# Patient Record
Sex: MALE | Race: WHITE | NOT HISPANIC OR LATINO | URBAN - METROPOLITAN AREA
[De-identification: names, ages, dates, MRNs, and addresses within clinical notes are randomized per-mention and may not be internally consistent; named-entity substitution may affect disease eponyms.]

---

## 2021-09-22 ENCOUNTER — OFFICE VISIT (OUTPATIENT)
Dept: SURGERY | Facility: CLINIC | Age: 21
End: 2021-09-22
Payer: COMMERCIAL

## 2021-09-22 VITALS — HEIGHT: 69 IN | BODY MASS INDEX: 20.73 KG/M2 | WEIGHT: 140 LBS

## 2021-09-22 DIAGNOSIS — L02.91 ABSCESS: Primary | ICD-10-CM

## 2021-09-22 PROCEDURE — 99202 OFFICE O/P NEW SF 15 MIN: CPT | Performed by: SURGERY

## 2021-09-22 PROCEDURE — 10060 I&D ABSCESS SIMPLE/SINGLE: CPT | Performed by: SURGERY

## 2021-09-22 NOTE — PROGRESS NOTES
Assessment/Plan:    Diagnoses and all orders for this visit:    Abscess     status post incision and drainage here in the office  Local wound care instructions given  Will see back in 3 weeks where we can definitively remove his underlying sebaceous cyst    Subjective:      Patient ID: Malathi Eddy is a 21 y o  male  Patient presents for evaluation of a cyst on his left upper arm  He has had the cyst for 5 years  The cyst became swollen, red and painful 2 months ago  Abscess  The current episode started more than 1 month ago  The problem occurs daily  The following portions of the patient's history were reviewed and updated as appropriate:     He  has no past medical history on file  He  has no past surgical history on file  His family history is not on file  He  reports that he has quit smoking  He has never used smokeless tobacco  No history on file for alcohol use and drug use  No current outpatient medications on file  No current facility-administered medications for this visit  He has No Known Allergies       Review of Systems      Objective:      Ht 5' 9" (1 753 m)   Wt 63 5 kg (140 lb)   BMI 20 67 kg/m²          Physical Exam  Skin:     General: Skin is warm and dry  Comments: Abscessed sebaceous cyst of the lateral aspect of left upper arm  Risks and benefits for incision and drainage were discussed with him and agreed to proceed         Incision and Drainage    Date/Time: 9/22/2021 3:14 PM  Performed by: Eloy Hunter DO  Authorized by: Eloy Hunter DO   Universal Protocol:  Consent: Verbal consent obtained  Risks and benefits: risks, benefits and alternatives were discussed  Consent given by: patient  Time out: Immediately prior to procedure a "time out" was called to verify the correct patient, procedure, equipment, support staff and site/side marked as required    Patient understanding: patient states understanding of the procedure being performed  Patient identity confirmed: verbally with patient      Patient location:  Clinic  Location:     Type:  Abscess    Location:  Upper extremity    Upper extremity location:  L arm  Pre-procedure details:     Procedure prep: Alcohol  Procedure details:     Incision types:  Single straight    Scalpel blade:  11    Approach:  Open    Incision depth:  Subcutaneous    Wound management:  Probed and deloculated    Drainage:  Bloody and purulent    Wound treatment:  Wound left open    Packing materials:  1/4 in gauze  Post-procedure details:     Patient tolerance of procedure:   Tolerated well, no immediate complications

## 2021-10-20 ENCOUNTER — TRANSCRIBE ORDERS (OUTPATIENT)
Dept: SURGERY | Facility: CLINIC | Age: 21
End: 2021-10-20

## 2021-10-20 ENCOUNTER — OFFICE VISIT (OUTPATIENT)
Dept: SURGERY | Facility: CLINIC | Age: 21
End: 2021-10-20
Payer: COMMERCIAL

## 2021-10-20 VITALS — WEIGHT: 140 LBS | HEIGHT: 69 IN | BODY MASS INDEX: 20.73 KG/M2

## 2021-10-20 DIAGNOSIS — L72.3 SEBACEOUS CYST: Primary | ICD-10-CM

## 2021-10-20 PROCEDURE — 88304 TISSUE EXAM BY PATHOLOGIST: CPT | Performed by: PATHOLOGY

## 2021-10-20 PROCEDURE — 11402 EXC TR-EXT B9+MARG 1.1-2 CM: CPT | Performed by: SURGERY

## 2021-10-20 PROCEDURE — 99213 OFFICE O/P EST LOW 20 MIN: CPT | Performed by: SURGERY

## 2021-11-03 ENCOUNTER — OFFICE VISIT (OUTPATIENT)
Dept: SURGERY | Facility: CLINIC | Age: 21
End: 2021-11-03

## 2021-11-03 DIAGNOSIS — L72.3 SEBACEOUS CYST: Primary | ICD-10-CM

## 2021-11-03 PROCEDURE — 99024 POSTOP FOLLOW-UP VISIT: CPT | Performed by: SURGERY
